# Patient Record
Sex: MALE | Race: OTHER | HISPANIC OR LATINO | URBAN - METROPOLITAN AREA
[De-identification: names, ages, dates, MRNs, and addresses within clinical notes are randomized per-mention and may not be internally consistent; named-entity substitution may affect disease eponyms.]

---

## 2023-07-12 ENCOUNTER — INPATIENT (INPATIENT)
Facility: HOSPITAL | Age: 33
LOS: 1 days | Discharge: HOME | End: 2023-07-14
Attending: INTERNAL MEDICINE | Admitting: INTERNAL MEDICINE
Payer: MEDICAID

## 2023-07-12 VITALS
SYSTOLIC BLOOD PRESSURE: 133 MMHG | DIASTOLIC BLOOD PRESSURE: 112 MMHG | RESPIRATION RATE: 30 BRPM | OXYGEN SATURATION: 97 % | HEART RATE: 110 BPM

## 2023-07-12 LAB
ALBUMIN SERPL ELPH-MCNC: 5.8 G/DL — HIGH (ref 3.3–5)
ALP SERPL-CCNC: 87 U/L — SIGNIFICANT CHANGE UP (ref 40–120)
ALT FLD-CCNC: 27 U/L — SIGNIFICANT CHANGE UP (ref 4–41)
AMPHET UR-MCNC: NEGATIVE — SIGNIFICANT CHANGE UP
ANION GAP SERPL CALC-SCNC: 26 MMOL/L — HIGH (ref 7–14)
APAP SERPL-MCNC: <10 UG/ML — LOW (ref 15–25)
APPEARANCE UR: CLEAR — SIGNIFICANT CHANGE UP
AST SERPL-CCNC: 32 U/L — SIGNIFICANT CHANGE UP (ref 4–40)
BACTERIA # UR AUTO: NEGATIVE /HPF — SIGNIFICANT CHANGE UP
BARBITURATES UR SCN-MCNC: NEGATIVE — SIGNIFICANT CHANGE UP
BASE EXCESS BLDV CALC-SCNC: 1.4 MMOL/L — SIGNIFICANT CHANGE UP (ref -2–3)
BASOPHILS # BLD AUTO: 0.06 K/UL — SIGNIFICANT CHANGE UP (ref 0–0.2)
BASOPHILS NFR BLD AUTO: 0.5 % — SIGNIFICANT CHANGE UP (ref 0–2)
BENZODIAZ UR-MCNC: NEGATIVE — SIGNIFICANT CHANGE UP
BILIRUB SERPL-MCNC: 1.3 MG/DL — HIGH (ref 0.2–1.2)
BILIRUB UR-MCNC: NEGATIVE — SIGNIFICANT CHANGE UP
BLOOD GAS VENOUS COMPREHENSIVE RESULT: SIGNIFICANT CHANGE UP
BUN SERPL-MCNC: 21 MG/DL — SIGNIFICANT CHANGE UP (ref 7–23)
CALCIUM SERPL-MCNC: 11 MG/DL — HIGH (ref 8.4–10.5)
CAST: 4 /LPF — SIGNIFICANT CHANGE UP (ref 0–4)
CHLORIDE BLDV-SCNC: 93 MMOL/L — LOW (ref 96–108)
CHLORIDE SERPL-SCNC: 90 MMOL/L — LOW (ref 98–107)
CK SERPL-CCNC: 582 U/L — HIGH (ref 30–200)
CO2 BLDV-SCNC: 22.7 MMOL/L — SIGNIFICANT CHANGE UP (ref 22–26)
CO2 SERPL-SCNC: 18 MMOL/L — LOW (ref 22–31)
COCAINE METAB.OTHER UR-MCNC: NEGATIVE — SIGNIFICANT CHANGE UP
COLOR SPEC: YELLOW — SIGNIFICANT CHANGE UP
CREAT SERPL-MCNC: 1.52 MG/DL — HIGH (ref 0.5–1.3)
CREATININE URINE RESULT, DAU: 73 MG/DL — SIGNIFICANT CHANGE UP
DIFF PNL FLD: ABNORMAL
EGFR: 62 ML/MIN/1.73M2 — SIGNIFICANT CHANGE UP
EOSINOPHIL # BLD AUTO: 0.01 K/UL — SIGNIFICANT CHANGE UP (ref 0–0.5)
EOSINOPHIL NFR BLD AUTO: 0.1 % — SIGNIFICANT CHANGE UP (ref 0–6)
ETHANOL SERPL-MCNC: <10 MG/DL — SIGNIFICANT CHANGE UP
GAS PNL BLDV: 128 MMOL/L — LOW (ref 136–145)
GAS PNL BLDV: SIGNIFICANT CHANGE UP
GLUCOSE BLDV-MCNC: 98 MG/DL — SIGNIFICANT CHANGE UP (ref 70–99)
GLUCOSE SERPL-MCNC: 97 MG/DL — SIGNIFICANT CHANGE UP (ref 70–99)
GLUCOSE UR QL: NEGATIVE MG/DL — SIGNIFICANT CHANGE UP
HCO3 BLDV-SCNC: 22 MMOL/L — SIGNIFICANT CHANGE UP (ref 22–29)
HCT VFR BLD CALC: 47.2 % — SIGNIFICANT CHANGE UP (ref 39–50)
HCT VFR BLDA CALC: 51 % — SIGNIFICANT CHANGE UP (ref 39–51)
HGB BLD CALC-MCNC: 16.9 G/DL — SIGNIFICANT CHANGE UP (ref 12.6–17.4)
HGB BLD-MCNC: 16.1 G/DL — SIGNIFICANT CHANGE UP (ref 13–17)
IANC: 9.19 K/UL — HIGH (ref 1.8–7.4)
IMM GRANULOCYTES NFR BLD AUTO: 0.5 % — SIGNIFICANT CHANGE UP (ref 0–0.9)
KETONES UR-MCNC: ABNORMAL MG/DL
LACTATE BLDV-MCNC: 2.4 MMOL/L — HIGH (ref 0.5–2)
LACTATE BLDV-MCNC: 6.3 MMOL/L — CRITICAL HIGH (ref 0.5–2)
LEUKOCYTE ESTERASE UR-ACNC: NEGATIVE — SIGNIFICANT CHANGE UP
LYMPHOCYTES # BLD AUTO: 1.23 K/UL — SIGNIFICANT CHANGE UP (ref 1–3.3)
LYMPHOCYTES # BLD AUTO: 10.8 % — LOW (ref 13–44)
MAGNESIUM SERPL-MCNC: 1.6 MG/DL — SIGNIFICANT CHANGE UP (ref 1.6–2.6)
MCHC RBC-ENTMCNC: 28.8 PG — SIGNIFICANT CHANGE UP (ref 27–34)
MCHC RBC-ENTMCNC: 34.1 GM/DL — SIGNIFICANT CHANGE UP (ref 32–36)
MCV RBC AUTO: 84.3 FL — SIGNIFICANT CHANGE UP (ref 80–100)
METHADONE UR-MCNC: NEGATIVE — SIGNIFICANT CHANGE UP
MONOCYTES # BLD AUTO: 0.84 K/UL — SIGNIFICANT CHANGE UP (ref 0–0.9)
MONOCYTES NFR BLD AUTO: 7.4 % — SIGNIFICANT CHANGE UP (ref 2–14)
NEUTROPHILS # BLD AUTO: 9.19 K/UL — HIGH (ref 1.8–7.4)
NEUTROPHILS NFR BLD AUTO: 80.7 % — HIGH (ref 43–77)
NITRITE UR-MCNC: NEGATIVE — SIGNIFICANT CHANGE UP
NRBC # BLD: 0 /100 WBCS — SIGNIFICANT CHANGE UP (ref 0–0)
NRBC # FLD: 0 K/UL — SIGNIFICANT CHANGE UP (ref 0–0)
OPIATES UR-MCNC: NEGATIVE — SIGNIFICANT CHANGE UP
OXYCODONE UR-MCNC: NEGATIVE — SIGNIFICANT CHANGE UP
PCO2 BLDV: 25 MMHG — LOW (ref 42–55)
PCP SPEC-MCNC: SIGNIFICANT CHANGE UP
PCP UR-MCNC: NEGATIVE — SIGNIFICANT CHANGE UP
PH BLDV: 7.55 — HIGH (ref 7.32–7.43)
PH UR: 6 — SIGNIFICANT CHANGE UP (ref 5–8)
PHOSPHATE SERPL-MCNC: 3 MG/DL — SIGNIFICANT CHANGE UP (ref 2.5–4.5)
PLATELET # BLD AUTO: 259 K/UL — SIGNIFICANT CHANGE UP (ref 150–400)
PO2 BLDV: 27 MMHG — SIGNIFICANT CHANGE UP (ref 25–45)
POTASSIUM BLDV-SCNC: 4.4 MMOL/L — SIGNIFICANT CHANGE UP (ref 3.5–5.1)
POTASSIUM SERPL-MCNC: 4.5 MMOL/L — SIGNIFICANT CHANGE UP (ref 3.5–5.3)
POTASSIUM SERPL-SCNC: 4.5 MMOL/L — SIGNIFICANT CHANGE UP (ref 3.5–5.3)
PROT SERPL-MCNC: 8.2 G/DL — SIGNIFICANT CHANGE UP (ref 6–8.3)
PROT UR-MCNC: NEGATIVE MG/DL — SIGNIFICANT CHANGE UP
RBC # BLD: 5.6 M/UL — SIGNIFICANT CHANGE UP (ref 4.2–5.8)
RBC # FLD: 12.4 % — SIGNIFICANT CHANGE UP (ref 10.3–14.5)
RBC CASTS # UR COMP ASSIST: 13 /HPF — HIGH (ref 0–4)
SALICYLATES SERPL-MCNC: <0.3 MG/DL — LOW (ref 15–30)
SAO2 % BLDV: 52 % — LOW (ref 67–88)
SODIUM SERPL-SCNC: 134 MMOL/L — LOW (ref 135–145)
SP GR SPEC: 1.01 — SIGNIFICANT CHANGE UP (ref 1–1.03)
SQUAMOUS # UR AUTO: 0 /HPF — SIGNIFICANT CHANGE UP (ref 0–5)
THC UR QL: POSITIVE
TOXICOLOGY SCREEN, DRUGS OF ABUSE, SERUM RESULT: SIGNIFICANT CHANGE UP
UROBILINOGEN FLD QL: 0.2 MG/DL — SIGNIFICANT CHANGE UP (ref 0.2–1)
WBC # BLD: 11.39 K/UL — HIGH (ref 3.8–10.5)
WBC # FLD AUTO: 11.39 K/UL — HIGH (ref 3.8–10.5)
WBC UR QL: 0 /HPF — SIGNIFICANT CHANGE UP (ref 0–5)

## 2023-07-12 PROCEDURE — 71045 X-RAY EXAM CHEST 1 VIEW: CPT | Mod: 26

## 2023-07-12 PROCEDURE — 99285 EMERGENCY DEPT VISIT HI MDM: CPT

## 2023-07-12 PROCEDURE — 74177 CT ABD & PELVIS W/CONTRAST: CPT | Mod: 26,MA

## 2023-07-12 RX ORDER — SODIUM CHLORIDE 9 MG/ML
2000 INJECTION, SOLUTION INTRAVENOUS ONCE
Refills: 0 | Status: COMPLETED | OUTPATIENT
Start: 2023-07-12 | End: 2023-07-12

## 2023-07-12 RX ORDER — MORPHINE SULFATE 50 MG/1
4 CAPSULE, EXTENDED RELEASE ORAL ONCE
Refills: 0 | Status: DISCONTINUED | OUTPATIENT
Start: 2023-07-12 | End: 2023-07-12

## 2023-07-12 RX ADMIN — MORPHINE SULFATE 4 MILLIGRAM(S): 50 CAPSULE, EXTENDED RELEASE ORAL at 23:02

## 2023-07-12 RX ADMIN — MORPHINE SULFATE 4 MILLIGRAM(S): 50 CAPSULE, EXTENDED RELEASE ORAL at 18:51

## 2023-07-12 RX ADMIN — SODIUM CHLORIDE 2000 MILLILITER(S): 9 INJECTION, SOLUTION INTRAVENOUS at 18:51

## 2023-07-12 NOTE — ED PROVIDER NOTE - OBJECTIVE STATEMENT
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  Patient brought in by EMS found with Peaked Ts on EKG. Patient reports last urine 10am. Denies cp, sob, diarrhea. Denies drug use

## 2023-07-12 NOTE — ED PROVIDER NOTE - LANGUAGE ASSISTANCE NEEDED
No-Patient/Caregiver offered and refused free interpretation services. RN bedside providing Translation/No-Patient/Caregiver offered and refused free interpretation services.

## 2023-07-12 NOTE — ED PROVIDER NOTE - PROGRESS NOTE DETAILS
Ivanna Rushing MD PGY-2: patient taken in sign out at time of admission pending CT abd, admission for likely rhabdomyolysis. abd CT no acute findings. discussed with hospitalist who accepted patient for admission for ongoing fluid resuscitation and monitoring.

## 2023-07-12 NOTE — ED PROVIDER NOTE - CARE PLAN
1 Principal Discharge DX:	Rhabdomyolysis   Principal Discharge DX:	Rhabdomyolysis  Secondary Diagnosis:	SUDHIR (acute kidney injury)

## 2023-07-12 NOTE — ED PROVIDER NOTE - ATTENDING CONTRIBUTION TO CARE
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  Severely uncomfortable.  VSS.  RLQ ttp.  EKG peaked TW throughout.  No PMXH, doesn't take meds.  Pt appears to have rhabdomyolysis, CK not particularly high but (+)blood in urine and mild SUDHIR.  May be early in rhabdo process.  Rx copious fluids, admit as rhabdo takes days to resolve, and needs frequent blood testing to guide therapy.   Peaked TW likely r/t FELICIA.   VS:  tachycardia tachypnea    GEN - malaise, mild distress body pain   A+O x3   HEAD - NC/AT     ENT - PEERL, EOMI, mucous membranes  dry, no discharge      NECK: Neck supple, non-tender without lymphadenopathy, no masses, no JVD  PULM - CTA b/l,  symmetric breath sounds  COR -  fast heart sounds    ABD - , ND, NT, soft,  BACK - no CVA tenderness, nontender spine     EXTREMS - no edema, no deformity, warm and well perfused   No focal muscle tenderness or rash or deformity.    SKIN - no rash    or bruising      NEUROLOGIC - alert, face symmetric, speech fluent, sensation nl, motor no focal deficit.

## 2023-07-12 NOTE — ED ADULT TRIAGE NOTE - CHIEF COMPLAINT QUOTE
Pt reporting to the ED for "Body pain". Pt  outside working all day, went to home complaining of  muscle cramping and pain, 911 called. EMS reports pt responsive to verbal stimuli, EKG by EMS peaked T waves, notification called in for possible rhabdomyolysis. Pt tachycardic in triage and hypertensive. Pt brought to room 27 charge notified.

## 2023-07-12 NOTE — ED ADULT NURSE NOTE - OBJECTIVE STATEMENT
Arvin RN: pt A&Ox4, no past medical history, presents to ED from home via EMS for body pain. Patient states he was working all day in the sun (works as a ). Reported cramping to upper and lower extremities that started around 3 pm but worsened which prompted the call to EMS. Patient arrives tachycardiac, with rigors (rectal temp 99.4), noticeable tetany to bilateral hands, 16 G to R AC placed via EMS with 1 liter of normal saline that was infused. Respirations are even and unlabored, sating at 100% on room air, 18 G to L AC placed in ED, labs sent, and medicated as ordered. Report given to primary RN.

## 2023-07-13 DIAGNOSIS — Z29.9 ENCOUNTER FOR PROPHYLACTIC MEASURES, UNSPECIFIED: ICD-10-CM

## 2023-07-13 DIAGNOSIS — M62.82 RHABDOMYOLYSIS: ICD-10-CM

## 2023-07-13 DIAGNOSIS — R10.9 UNSPECIFIED ABDOMINAL PAIN: ICD-10-CM

## 2023-07-13 DIAGNOSIS — N17.9 ACUTE KIDNEY FAILURE, UNSPECIFIED: ICD-10-CM

## 2023-07-13 DIAGNOSIS — J96.01 ACUTE RESPIRATORY FAILURE WITH HYPOXIA: ICD-10-CM

## 2023-07-13 LAB
ANION GAP SERPL CALC-SCNC: 11 MMOL/L — SIGNIFICANT CHANGE UP (ref 7–14)
ANION GAP SERPL CALC-SCNC: 12 MMOL/L — SIGNIFICANT CHANGE UP (ref 7–14)
BASE EXCESS BLDV CALC-SCNC: 1.5 MMOL/L — SIGNIFICANT CHANGE UP (ref -2–3)
BASOPHILS # BLD AUTO: 0.04 K/UL — SIGNIFICANT CHANGE UP (ref 0–0.2)
BASOPHILS NFR BLD AUTO: 0.7 % — SIGNIFICANT CHANGE UP (ref 0–2)
BUN SERPL-MCNC: 14 MG/DL — SIGNIFICANT CHANGE UP (ref 7–23)
BUN SERPL-MCNC: 14 MG/DL — SIGNIFICANT CHANGE UP (ref 7–23)
CA-I SERPL-SCNC: 1.2 MMOL/L — SIGNIFICANT CHANGE UP (ref 1.15–1.33)
CALCIUM SERPL-MCNC: 8.9 MG/DL — SIGNIFICANT CHANGE UP (ref 8.4–10.5)
CALCIUM SERPL-MCNC: 9.3 MG/DL — SIGNIFICANT CHANGE UP (ref 8.4–10.5)
CHLORIDE BLDV-SCNC: 102 MMOL/L — SIGNIFICANT CHANGE UP (ref 96–108)
CHLORIDE SERPL-SCNC: 101 MMOL/L — SIGNIFICANT CHANGE UP (ref 98–107)
CHLORIDE SERPL-SCNC: 103 MMOL/L — SIGNIFICANT CHANGE UP (ref 98–107)
CK SERPL-CCNC: 1210 U/L — HIGH (ref 30–200)
CK SERPL-CCNC: 968 U/L — HIGH (ref 30–200)
CO2 BLDV-SCNC: 28.6 MMOL/L — HIGH (ref 22–26)
CO2 SERPL-SCNC: 24 MMOL/L — SIGNIFICANT CHANGE UP (ref 22–31)
CO2 SERPL-SCNC: 24 MMOL/L — SIGNIFICANT CHANGE UP (ref 22–31)
CREAT SERPL-MCNC: 0.87 MG/DL — SIGNIFICANT CHANGE UP (ref 0.5–1.3)
CREAT SERPL-MCNC: 0.95 MG/DL — SIGNIFICANT CHANGE UP (ref 0.5–1.3)
CULTURE RESULTS: SIGNIFICANT CHANGE UP
EGFR: 110 ML/MIN/1.73M2 — SIGNIFICANT CHANGE UP
EGFR: 118 ML/MIN/1.73M2 — SIGNIFICANT CHANGE UP
EOSINOPHIL # BLD AUTO: 0.1 K/UL — SIGNIFICANT CHANGE UP (ref 0–0.5)
EOSINOPHIL NFR BLD AUTO: 1.8 % — SIGNIFICANT CHANGE UP (ref 0–6)
GAS PNL BLDV: 136 MMOL/L — SIGNIFICANT CHANGE UP (ref 136–145)
GAS PNL BLDV: SIGNIFICANT CHANGE UP
GLUCOSE BLDV-MCNC: 101 MG/DL — HIGH (ref 70–99)
GLUCOSE SERPL-MCNC: 104 MG/DL — HIGH (ref 70–99)
GLUCOSE SERPL-MCNC: 106 MG/DL — HIGH (ref 70–99)
HCO3 BLDV-SCNC: 27 MMOL/L — SIGNIFICANT CHANGE UP (ref 22–29)
HCT VFR BLD CALC: 41.5 % — SIGNIFICANT CHANGE UP (ref 39–50)
HCT VFR BLDA CALC: 42 % — SIGNIFICANT CHANGE UP (ref 39–51)
HGB BLD CALC-MCNC: 14 G/DL — SIGNIFICANT CHANGE UP (ref 12.6–17.4)
HGB BLD-MCNC: 13.7 G/DL — SIGNIFICANT CHANGE UP (ref 13–17)
IANC: 3.36 K/UL — SIGNIFICANT CHANGE UP (ref 1.8–7.4)
IMM GRANULOCYTES NFR BLD AUTO: 0.4 % — SIGNIFICANT CHANGE UP (ref 0–0.9)
LACTATE BLDV-MCNC: 1.3 MMOL/L — SIGNIFICANT CHANGE UP (ref 0.5–2)
LYMPHOCYTES # BLD AUTO: 1.61 K/UL — SIGNIFICANT CHANGE UP (ref 1–3.3)
LYMPHOCYTES # BLD AUTO: 28.6 % — SIGNIFICANT CHANGE UP (ref 13–44)
MAGNESIUM SERPL-MCNC: 2 MG/DL — SIGNIFICANT CHANGE UP (ref 1.6–2.6)
MCHC RBC-ENTMCNC: 28.8 PG — SIGNIFICANT CHANGE UP (ref 27–34)
MCHC RBC-ENTMCNC: 33 GM/DL — SIGNIFICANT CHANGE UP (ref 32–36)
MCV RBC AUTO: 87.2 FL — SIGNIFICANT CHANGE UP (ref 80–100)
MONOCYTES # BLD AUTO: 0.49 K/UL — SIGNIFICANT CHANGE UP (ref 0–0.9)
MONOCYTES NFR BLD AUTO: 8.7 % — SIGNIFICANT CHANGE UP (ref 2–14)
NEUTROPHILS # BLD AUTO: 3.36 K/UL — SIGNIFICANT CHANGE UP (ref 1.8–7.4)
NEUTROPHILS NFR BLD AUTO: 59.8 % — SIGNIFICANT CHANGE UP (ref 43–77)
NRBC # BLD: 0 /100 WBCS — SIGNIFICANT CHANGE UP (ref 0–0)
NRBC # FLD: 0 K/UL — SIGNIFICANT CHANGE UP (ref 0–0)
PCO2 BLDV: 46 MMHG — SIGNIFICANT CHANGE UP (ref 42–55)
PH BLDV: 7.38 — SIGNIFICANT CHANGE UP (ref 7.32–7.43)
PHOSPHATE SERPL-MCNC: 3.3 MG/DL — SIGNIFICANT CHANGE UP (ref 2.5–4.5)
PLATELET # BLD AUTO: 191 K/UL — SIGNIFICANT CHANGE UP (ref 150–400)
PO2 BLDV: 56 MMHG — HIGH (ref 25–45)
POTASSIUM BLDV-SCNC: 3.6 MMOL/L — SIGNIFICANT CHANGE UP (ref 3.5–5.1)
POTASSIUM SERPL-MCNC: 3.7 MMOL/L — SIGNIFICANT CHANGE UP (ref 3.5–5.3)
POTASSIUM SERPL-MCNC: 3.8 MMOL/L — SIGNIFICANT CHANGE UP (ref 3.5–5.3)
POTASSIUM SERPL-SCNC: 3.7 MMOL/L — SIGNIFICANT CHANGE UP (ref 3.5–5.3)
POTASSIUM SERPL-SCNC: 3.8 MMOL/L — SIGNIFICANT CHANGE UP (ref 3.5–5.3)
RBC # BLD: 4.76 M/UL — SIGNIFICANT CHANGE UP (ref 4.2–5.8)
RBC # FLD: 12.6 % — SIGNIFICANT CHANGE UP (ref 10.3–14.5)
SAO2 % BLDV: 86.7 % — SIGNIFICANT CHANGE UP (ref 67–88)
SODIUM SERPL-SCNC: 137 MMOL/L — SIGNIFICANT CHANGE UP (ref 135–145)
SODIUM SERPL-SCNC: 138 MMOL/L — SIGNIFICANT CHANGE UP (ref 135–145)
SPECIMEN SOURCE: SIGNIFICANT CHANGE UP
WBC # BLD: 5.62 K/UL — SIGNIFICANT CHANGE UP (ref 3.8–10.5)
WBC # FLD AUTO: 5.62 K/UL — SIGNIFICANT CHANGE UP (ref 3.8–10.5)

## 2023-07-13 PROCEDURE — 99223 1ST HOSP IP/OBS HIGH 75: CPT

## 2023-07-13 RX ORDER — ACETAMINOPHEN 500 MG
2 TABLET ORAL
Qty: 0 | Refills: 0 | DISCHARGE
Start: 2023-07-13

## 2023-07-13 RX ORDER — ACETAMINOPHEN 500 MG
650 TABLET ORAL EVERY 6 HOURS
Refills: 0 | Status: DISCONTINUED | OUTPATIENT
Start: 2023-07-13 | End: 2023-07-14

## 2023-07-13 RX ORDER — SODIUM CHLORIDE 9 MG/ML
1000 INJECTION INTRAMUSCULAR; INTRAVENOUS; SUBCUTANEOUS
Refills: 0 | Status: DISCONTINUED | OUTPATIENT
Start: 2023-07-13 | End: 2023-07-13

## 2023-07-13 RX ORDER — SODIUM CHLORIDE 9 MG/ML
1000 INJECTION INTRAMUSCULAR; INTRAVENOUS; SUBCUTANEOUS
Refills: 0 | Status: DISCONTINUED | OUTPATIENT
Start: 2023-07-13 | End: 2023-07-14

## 2023-07-13 RX ORDER — LANOLIN ALCOHOL/MO/W.PET/CERES
3 CREAM (GRAM) TOPICAL AT BEDTIME
Refills: 0 | Status: DISCONTINUED | OUTPATIENT
Start: 2023-07-13 | End: 2023-07-14

## 2023-07-13 RX ORDER — ONDANSETRON 8 MG/1
4 TABLET, FILM COATED ORAL EVERY 8 HOURS
Refills: 0 | Status: DISCONTINUED | OUTPATIENT
Start: 2023-07-13 | End: 2023-07-14

## 2023-07-13 RX ADMIN — Medication 650 MILLIGRAM(S): at 18:58

## 2023-07-13 RX ADMIN — SODIUM CHLORIDE 150 MILLILITER(S): 9 INJECTION INTRAMUSCULAR; INTRAVENOUS; SUBCUTANEOUS at 12:07

## 2023-07-13 RX ADMIN — Medication 650 MILLIGRAM(S): at 19:55

## 2023-07-13 RX ADMIN — SODIUM CHLORIDE 100 MILLILITER(S): 9 INJECTION INTRAMUSCULAR; INTRAVENOUS; SUBCUTANEOUS at 01:34

## 2023-07-13 NOTE — ED ADULT NURSE REASSESSMENT NOTE - NS ED NURSE REASSESS COMMENT FT1
Break Coverage RN: Pt A&Ox4, respirations equal and unlabored. Pt resting in stretcher at this time, offers no complaints. Pt transported to inpatient bed assignment. No acute distress noted upon leaving the unit.

## 2023-07-13 NOTE — H&P ADULT - PROBLEM SELECTOR PLAN 2
Pt with SUDHIR on admission to hospital with sCr of 1.52. Likely in setting of dehydration, less likely heme induced SUDHIR 2/2 rhabdo. Resolving.    - Trend Cr (1.52 -> .95)  - avoid nephrotoxic medications.

## 2023-07-13 NOTE — DISCHARGE NOTE PROVIDER - HOSPITAL COURSE
CHRISTIN MONROY is a 34yo M with no PMH who presented with acute onset of muscle pain and cramping after a day of working outside in the sun. Pt was in his usual state of health until about 2pm the day of presentation to the ED. 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 The patient describes that he normally skips breakfast but had to work through his lunch break. He did not have a break until 2pm that day, at which point he went to wash his face since it was dirty. He describes that immediately after washing his face, he began to feel a sudden onset of cramping and pain in his legs. This pain continued until he had to leave work early around 3pm. The patient went home and noticed that the pain became diffuse throughout his body and he felt the pain over his face, hands, fingers, legs, back, and abdomen. He endorses that he also had "hernia like" pain and knots all over his body. The "hernias" were similar to when a muscle would cramp up and be contracted. He also describes feeling very hot at this time. He describes that he was in so much pain that he was shaking. Pt denies taking anything at home for the pain. Pt then came to the ED for evaluation given nonresolving pain.     Hospital Course: Patient seen and evaluated in the ED. Found to be tachycardic to 110, hypertensive to 144/130, T99.4F, RR 30. EKG found to have peaked T waves. Pt   started on NC@3L and 2L bolus with LR. UA showed blood with no RBC. CXR, CT A/P negative. CK found to be elevated @ 582, lactate 6.3. Pt admitted to medicine for continued management of rhabdomyolysis. On 7/13, patient reported resolution of symptoms following IVF rehydration with good urine output. Patient creatine kinase trended and STABILIZED/DOWNTRENDING. On 7/14, patient examined and deemed medically stable for discharge.    Pt seen at bedside this AM, reports that he is feeling good with no acute concerns. Pt reported resolved pain after fluid bolus with resolution of VS abnormalities. He reports urinating 7 times since entering the hospital, with no gross hematuria or dark urine. Pt denies fever, headaches, dizziness. Pt denies muscle pain, cramping, or swelling. Pt described feeling weak due to not eating in  2 days. CHRISTIN MONROY is a 32yo M with no PMH who presented with acute onset of muscle pain and cramping after a day of working outside in the sun. Pt was in his usual state of health until about 2pm the day of presentation to the ED. 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 The patient describes that he normally skips breakfast but had to work through his lunch break. He did not have a break until 2pm that day, at which point he went to wash his face since it was dirty. He describes that immediately after washing his face, he began to feel a sudden onset of cramping and pain in his legs. This pain continued until he had to leave work early around 3pm. The patient went home and noticed that the pain became diffuse throughout his body and he felt the pain over his face, hands, fingers, legs, back, and abdomen. He endorses that he also had "hernia like" pain and knots all over his body. The "hernias" were similar to when a muscle would cramp up and be contracted. He also describes feeling very hot at this time. He describes that he was in so much pain that he was shaking. Pt denies taking anything at home for the pain. Pt then came to the ED for evaluation given nonresolving pain.     Hospital Course: Patient seen and evaluated in the ED. Found to be tachycardic to 110, hypertensive to 144/130, T99.4F, RR 30. EKG found to have peaked T waves. Pt   started on NC@3L and 2L bolus with LR. UA showed blood with no RBC. CXR, CT A/P negative. CK found to be elevated @ 582, lactate 6.3. Pt admitted to medicine for continued management of rhabdomyolysis. On 7/13, patient reported resolution of symptoms following IVF rehydration with good urine output. Patient creatine kinase trended and downtrending on 7/14. On 7/14, patient examined and is hemodynamically stable and deemed medically stable for discharge.

## 2023-07-13 NOTE — H&P ADULT - ATTENDING COMMENTS
33M, recently immigrated from [facility], no PMH p/w acute onset of muscle pain and cramping during work as a , found to have SUDHIR and mildly elevated CK, a/f treatment of rhabdomyolysis.  SUDHIR resolved, will c/w fluids as CK mildly elevated, will trend and if coming down can DC in AM.

## 2023-07-13 NOTE — H&P ADULT - NSICDXFAMILYHX_GEN_ALL_CORE_FT
FAMILY HISTORY:  Aunt  Still living? Yes, Estimated age: Age Unknown  FH: diabetes mellitus, Age at diagnosis: Age Unknown

## 2023-07-13 NOTE — H&P ADULT - PROBLEM SELECTOR PLAN 3
Pt with reported "hernia like pain", diffuse pain across abdomen. Suspect metabolic i/s/o rhabdo vs less likely infectious given no N/V or diarrhea. CT A/P with no acute findings decreases suspicion for further structural causes. resolved.    - 7/12 CT A/P negative.

## 2023-07-13 NOTE — H&P ADULT - HISTORY OF PRESENT ILLNESS
Pt interviewed with  #s [id number], [id number]    CHRISTIN MONROY is a 34yo M with no PMH who presented with acute onset of muscle pain and cramping after a day of working outside in the sun. Pt was in his usual state of health until about 2pm the day of presentation to the ED. 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 The patient describes that he normally skips breakfast but had to work through his lunch break. He did not have a break until 2pm that day, at which point he went to wash his face since it was dirty. He describes that immediately after washing his face, he began to feel a sudden onset of cramping and pain in his legs. This pain continued until he had to leave work early around 3pm. The patient went home and noticed that the pain became diffuse throughout his body and he felt the pain over his face, hands, fingers, legs, back, and abdomen. He endorses that he also had "hernia like" pain and knots all over his body. The "hernias" were similar to when a muscle would cramp up and be contracted. He also describes feeling very hot at this time. He describes that he was in so much pain that he was shaking. Pt denies taking anything at home for the pain. Pt then came to the ED for evaluation given nonresolving pain.     Pt seen at bedside this AM, reports that he is feeling good with no acute concerns. Pt reported resolved pain after fluid bolus with resolution of VS abnormalities. He reports urinating 7 times since entering the hospital, with no gross hematuria or dark urine. Pt denies fever, headaches, dizziness. Pt denies muscle pain, cramping, or swelling. Pt described feeling weak due to not eating in  2 days.    ED Course: Patient seen and evaluated in the ED. Found to be tachycardic to 110, hypertensive to 144/130, T99.4F, RR 30. EKG found to have peaked T waves. Pt started on NC@3L and 2L bolus with LR. UA showed blood with no RBC c/w rhabdo. CXR, CT A/P negative. CK found to be elevated @ 582, lactate 6.3. Pt interviewed with  #s [id number], [id number]    CHRISTIN MONROY is a 32yo M with no PMH who presented with acute onset of muscle pain and cramping after a day of working outside in the sun. Pt was in his usual state of health until about 2pm the day of presentation to the ED. 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 The patient describes that he normally skips breakfast but had to work through his lunch break. He did not have a break until 2pm that day, at which point he went to wash his face since it was dirty. He describes that immediately after washing his face, he began to feel a sudden onset of cramping and pain in his legs. This pain continued until he had to leave work early around 3pm. The patient went home and noticed that the pain became diffuse throughout his body and he felt the pain over his face, hands, fingers, legs, back, and abdomen. He endorses that he also had "hernia like" pain and knots all over his body. The "hernias" were similar to when a muscle would cramp up and be contracted. He also describes feeling very hot at this time. He describes that he was in so much pain that he was shaking. Pt denies taking anything at home for the pain. Pt then came to the ED for evaluation given nonresolving pain.     ED Course: Patient seen and evaluated in the ED. Found to be tachycardic to 110, hypertensive to 144/130, T99.4F, RR 30. EKG found to have peaked T waves. Pt   started on NC@3L and 2L bolus with LR. UA showed blood with no RBC. CXR, CT A/P negative. CK found to be elevated @ 582, lactate 6.3.    Pt seen at bedside this AM, reports that he is feeling good with no acute concerns. Pt reported resolved pain after fluid bolus with resolution of VS abnormalities. He reports urinating 7 times since entering the hospital, with no gross hematuria or dark urine. Pt denies fever, headaches, dizziness. Pt denies muscle pain, cramping, or swelling. Pt described feeling weak due to not eating in  2 days.

## 2023-07-13 NOTE — H&P ADULT - PROBLEM SELECTOR PLAN 1
Patient with muscle pain and cramping that began in lower extremity and spread to diffuse pattern in setting of physically intense work day. UA w/ large blood, 14 rbc, elevated CK. Likely rhabdomyolysis vs less likely heat exhaustion given afebrile on admission.    - s/p 2L bolus LR  - hydration with NS@100cc/hr  - trend CK (582 -> 968) Patient with muscle pain and cramping that began in lower extremity and spread to diffuse pattern in setting of physically intense work day. UA w/ large blood, 14 rbc, elevated CK. Likely rhabdomyolysis vs less likely heat exhaustion given afebrile on admission.    - s/p 2L bolus LR  - hydration with NS@150cc/hr  - trend CK (582 -> 968)

## 2023-07-13 NOTE — ED ADULT NURSE REASSESSMENT NOTE - CONDITION
Spoke to MAR. Instructed not to place indwelling francisco catheter until attending evaluates lalito. Kirsten torres , R.N. aware.

## 2023-07-13 NOTE — PATIENT PROFILE ADULT - FALL HARM RISK - UNIVERSAL INTERVENTIONS
Bed in lowest position, wheels locked, appropriate side rails in place/Call bell, personal items and telephone in reach/Instruct patient to call for assistance before getting out of bed or chair/Non-slip footwear when patient is out of bed/[place] to call system/Physically safe environment - no spills, clutter or unnecessary equipment/Purposeful Proactive Rounding/Room/bathroom lighting operational, light cord in reach

## 2023-07-13 NOTE — H&P ADULT - NSHPLABSRESULTS_GEN_ALL_CORE
16.1   11.39 )-----------( 259      ( 12 Jul 2023 18:34 )             47.2       07-13    137  |  101  |  14  ----------------------------<  106<H>  3.7   |  24  |  0.95    Ca    9.3      13 Jul 2023 00:58  Phos  3.0     07-12  Mg     1.60     07-12    TPro  8.2  /  Alb  5.8<H>  /  TBili  1.3<H>  /  DBili  x   /  AST  32  /  ALT  27  /  AlkPhos  87  07-12      Creatine Kinase, Serum: 968 U/L (07.13.23 @ 00:58)   Creatine Kinase, Serum: 582 U/L (07.12.23 @ 18:34)     Urinalysis Basic - ( 13 Jul 2023 00:58 )    Color: x / Appearance: x / SG: x / pH: x  Gluc: 106 mg/dL / Ketone: x  / Bili: x / Urobili: x   Blood: x / Protein: x / Nitrite: x   Leuk Esterase: x / RBC: x / WBC x   Sq Epi: x / Non Sq Epi: x / Bacteria: x

## 2023-07-13 NOTE — DISCHARGE NOTE PROVIDER - NSDCCPCAREPLAN_GEN_ALL_CORE_FT
PRINCIPAL DISCHARGE DIAGNOSIS  Diagnosis: Rhabdomyolysis  Assessment and Plan of Treatment: You were admitted to the hospital for management of rhabdomyolysis. This means that your muscles were injured and breaking down, which caused your pain and cramping. You received intravenous fluid to help rehydrate you. After receiving the fluids, you had improvement in your symptoms. We monitored your creatine kinase, a marker for your muscle breakdown, and found that your numbers were decreasing.   Please call and make an appointment to see a doctor in 1-2 weeks. If you notice that you are having severe muscle pain and cramping, blood in urine, or dark-brown urine, please go to the ER for evaluation. reach out to the emergency room for evaluation.      SECONDARY DISCHARGE DIAGNOSES  Diagnosis: SUDHIR (acute kidney injury)  Assessment and Plan of Treatment: Your creatinine, which is a marker for your kidney function, was high when you were admitted to the hospital. After you received fluids, your creatinine normalized. Your increase was likely due to dehydration. Please ensure you hydrate well while working and take breaks if possible from the sun.     PRINCIPAL DISCHARGE DIAGNOSIS  Diagnosis: Rhabdomyolysis  Assessment and Plan of Treatment: You were admitted to the hospital for management of rhabdomyolysis. This means that your muscles were injured and breaking down, which caused your pain and cramping. You received intravenous fluid to help rehydrate you. After receiving the fluids, you had improvement in your symptoms. We monitored your creatine kinase, a marker for your muscle breakdown, and found that your numbers were decreasing.   Please call and make an appointment to see a doctor in 1-2 weeks. If you notice that you are having severe muscle pain and cramping, blood in urine, or dark-brown urine, please go to the ER for evaluation. reach out to the emergency room for evaluation.  Ingresó en el hospital para el tratamiento de la rabdomiolisis. McCoy significa que royer músculos se lesionaron y se rompieron, lo que causó lund dolor y calambres. Recibió líquido intravenoso para ayudar a rehidratarlo. Después de recibir los líquidos, royer síntomas mejoraron. Supervisamos lund creatina quinasa, un marcador de lund descomposición muscular, y descubrimos que royer números estaban disminuyendo.  Por favor llame y quinten amarjit sajan para donya a un médico en 1-2 semanas. Si nota que tiene dolor muscular intenso y calambres, jasmin en la orina u orina de color marrón oscuro, vaya a la bethany de emergencias para amarjit evaluación. acérquese a la bethany de emergencias para amarjit evaluación.      SECONDARY DISCHARGE DIAGNOSES  Diagnosis: SUDHIR (acute kidney injury)  Assessment and Plan of Treatment: Your creatinine, which is a marker for your kidney function, was high when you were admitted to the hospital. After you received fluids, your creatinine normalized. Your increase was likely due to dehydration. Please ensure you hydrate well while working and take breaks if possible from the sun.  Lund creatinina, que es un marcador de la función renal, estaba fredi cuando ingresó en el hospital. Después de recibir líquidos, lund creatinina se normalizó. Lund aumento probablemente se debió a la deshidratación. Asegúrese de hidratarse jese mientras trabaja y, si es posible, tome descansos del sol.

## 2023-07-13 NOTE — H&P ADULT - NSHPSOCIALHISTORY_GEN_ALL_CORE
Lives at home with family (wife, 4 kids). 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

## 2023-07-13 NOTE — DISCHARGE NOTE PROVIDER - NSFOLLOWUPCLINICSTOKEN_GEN_ALL_ED_FT
064435:2 weeks|| ||00\01||False;202765: || ||00\01||False; 919845:2 weeks|| ||00\01||False;383153:2 weeks|| ||00\01||False; 403630:2 weeks|| ||00\01||False;841080:2 weeks|| ||00\01||False; 710781:2 weeks|| ||00\01||False;582222:2 weeks|| ||00\01||False;

## 2023-07-13 NOTE — DISCHARGE NOTE PROVIDER - NSDCCPTREATMENT_GEN_ALL_CORE_FT
PRINCIPAL PROCEDURE  Procedure: CT appendix w contrast  Findings and Treatment: 7/12  FINDINGS:  LOWER CHEST: Within normal limits.  LIVER: Within normal limits.  BILE DUCTS: Normal caliber.  GALLBLADDER: Within normal limits.  SPLEEN: Within normal limits.  PANCREAS: Within normal limits.  ADRENALS: Within normal limits.  KIDNEYS/URETERS: Within normal limits.  BLADDER: Within normal limits.  REPRODUCTIVE ORGANS: Prostate within normal limits.  BOWEL: No bowel obstruction. Appendix is normal.  PERITONEUM: No ascites.  VESSELS: Within normal limits.  RETROPERITONEUM/LYMPH NODES: No lymphadenopathy.  ABDOMINAL WALL: Within normal limits.  BONES: Within normal limits.  IMPRESSION:  Normal appendix.        SECONDARY PROCEDURE  Procedure: Urinalysis  Findings and Treatment: 7/12  Urinalysis (07.12.23 @ 19:05)   Glucose Qualitative, Urine: Negative mg/dL  Blood, Urine: Large  pH Urine: 6.0  Color: Yellow  Urine Appearance: Clear  Bilirubin: Negative  Ketone - Urine: Trace mg/dL  Specific Gravity: 1.008  Protein, Urine: Negative mg/dL  Urobilinogen: 0.2 mg/dL  Nitrite: Negative  Leukocyte Esterase Concentration: Negative      Procedure: Creatine kinase  Findings and Treatment:      PRINCIPAL PROCEDURE  Procedure: CT appendix w contrast  Findings and Treatment: 7/12  FINDINGS:  LOWER CHEST: Within normal limits.  LIVER: Within normal limits.  BILE DUCTS: Normal caliber.  GALLBLADDER: Within normal limits.  SPLEEN: Within normal limits.  PANCREAS: Within normal limits.  ADRENALS: Within normal limits.  KIDNEYS/URETERS: Within normal limits.  BLADDER: Within normal limits.  REPRODUCTIVE ORGANS: Prostate within normal limits.  BOWEL: No bowel obstruction. Appendix is normal.  PERITONEUM: No ascites.  VESSELS: Within normal limits.  RETROPERITONEUM/LYMPH NODES: No lymphadenopathy.  ABDOMINAL WALL: Within normal limits.  BONES: Within normal limits.  IMPRESSION:  Normal appendix.        SECONDARY PROCEDURE  Procedure: Urinalysis  Findings and Treatment: 7/12  Urinalysis (07.12.23 @ 19:05)   Glucose Qualitative, Urine: Negative mg/dL  Blood, Urine: Large  pH Urine: 6.0  Color: Yellow  Urine Appearance: Clear  Bilirubin: Negative  Ketone - Urine: Trace mg/dL  Specific Gravity: 1.008  Protein, Urine: Negative mg/dL  Urobilinogen: 0.2 mg/dL  Nitrite: Negative  Leukocyte Esterase Concentration: Negative      Procedure: Creatine kinase  Findings and Treatment: Creatine Kinase, Serum: 804 U/L (07.14.23 @ 05:47)   Creatine Kinase, Serum: 1210 U/L (07.13.23 @ 14:50)   Creatine Kinase, Serum: 968 U/L (07.13.23 @ 00:58)   Creatine Kinase, Serum: 582 U/L (07.12.23 @ 18:34)

## 2023-07-13 NOTE — DISCHARGE NOTE PROVIDER - NSFOLLOWUPCLINICS_GEN_ALL_ED_FT
[facility]  Internal Medicine  [address] [place] [address]  Phone: [phone]  Fax: [phone]  Follow Up Time: 2 weeks    [facility]  General Pediatrics  [address] [place] [address].  [place] [address]  Phone: [phone]  Fax: [phone]     [facility]  General Pediatrics  [address] [place] [address]  Phone: [phone]  Fax: [phone]  Follow Up Time: 2 weeks    [facility] Internal [facility]  General Internal Medicine  [address] [place] [address]  Phone: [phone]  Fax:   Follow Up Time: 2 weeks     [facility]  General Pediatrics  [address] [place] [address]  Phone: [phone]  Fax: [phone]  Follow Up Time: 2 weeks    [facility] [place]  Internal Medicine  [address] [place] [address]  Phone: [phone]  Fax: [phone]  Follow Up Time: 2 weeks     [facility] [place]  Internal Medicine  [address] [place] [address]  Phone: [phone]  Fax: [phone]  Follow Up Time: 2 weeks    [facility] Pediatric[facility] at [facility]  [address] [place] [address], [facility] [address]  [place] [address]  Phone: [phone]  Fax:   Follow Up Time: 2 weeks

## 2023-07-13 NOTE — H&P ADULT - PROBLEM SELECTOR PLAN 4
DVT: Ambulate as tolerated  Diet: regular diet  Communication: Pending discussion with wife this PM 7/13

## 2023-07-13 NOTE — H&P ADULT - ASSESSMENT
CHRISTIN MONROY is a 32yo M with no PMH who presented with acute onset of muscle pain and cramping after a day of working outside in the sun found to have UA, CK, and clinical history consistent with rhabdomyolysis. Pt admitted for management of rhabdomyolysis and currently improving clinically.

## 2023-07-13 NOTE — H&P ADULT - NSHPPHYSICALEXAM_GEN_ALL_CORE
T(C): 36.6 (07-13-23 @ 02:30), Max: 37.4 (07-12-23 @ 18:35)  HR: 62 (07-13-23 @ 02:30) (62 - 110)  BP: 105/72 (07-13-23 @ 02:30) (105/72 - 144/130)  RR: 18 (07-13-23 @ 02:30) (18 - 30)  SpO2: 100% (07-13-23 @ 02:30) (97% - 100%)    CONSTITUTIONAL: Well groomed, no apparent distress  EYES: PERRLA and symmetric, EOMI, No conjunctival or scleral injection, non-icteric  ENMT: Oral mucosa with moist membranes. Normal dentition; no pharyngeal injection or exudates  NECK: Supple, symmetric and without tracheal deviation   RESP: No respiratory distress, no use of accessory muscles; CTA b/l, no WRR  CV: RRR, +S1S2, no MRG; no JVD; no peripheral edema  GI: Soft, NT, ND, no rebound, no guarding; no palpable masses; no hepatosplenomegaly; no hernia palpated  LYMPH: No cervical LAD or tenderness; no axillary LAD or tenderness; no inguinal LAD or tenderness  MSK: Normal gait; No digital clubbing or cyanosis; examination of the (head/neck/spine/ribs/pelvis, RUE, LUE, RLE, LLE) without misalignment,            Normal ROM without pain, no spinal tenderness, normal muscle strength/tone  SKIN: No rashes or ulcers noted; no subcutaneous nodules or induration palpable  NEURO: CN II-XII intact; normal reflexes in upper and lower extremities, sensation intact in upper and lower extremities b/l to light touch   PSYCH: Appropriate insight/judgment; A+O x 3, mood and affect appropriate, recent/remote memory intact T(C): 36.6 (07-13-23 @ 02:30), Max: 37.4 (07-12-23 @ 18:35)  HR: 62 (07-13-23 @ 02:30) (62 - 110)  BP: 105/72 (07-13-23 @ 02:30) (105/72 - 144/130)  RR: 18 (07-13-23 @ 02:30) (18 - 30)  SpO2: 100% (07-13-23 @ 02:30) (97% - 100%)    CONSTITUTIONAL: Well groomed, no apparent distress  EYES: PERRLA and symmetric, EOMI, No conjunctival or scleral injection, non-icteric  ENMT: Oral mucosa with moist membranes. Normal dentition; no pharyngeal injection or exudates  NECK: Supple, symmetric and without tracheal deviation   RESP: No respiratory distress, no use of accessory muscles; CTA b/l, no WRR  CV: RRR, +S1S2, no MRG; no JVD; no peripheral edema  GI: Soft, NT, ND, no rebound, no guarding; no palpable masses; no hepatosplenomegaly; no hernia palpated  LYMPH: No cervical LAD or tenderness; no axillary LAD or tenderness; no inguinal LAD or tenderness  MSK: No digital clubbing or cyanosis; Normal ROM without pain, no spinal tenderness, 4+ strength UE/LE  SKIN: No rashes or ulcers noted; no subcutaneous nodules or induration palpable  NEURO: CN II-XII intact; normal reflexes in upper and lower extremities, sensation intact in upper and lower extremities b/l to light touch   PSYCH: Appropriate insight/judgment; A+O x 3, mood and affect appropriate, recent/remote memory intact

## 2023-07-13 NOTE — DISCHARGE NOTE PROVIDER - ATTENDING DISCHARGE PHYSICAL EXAMINATION:
Constitutional: WDWN resting comfortably in bed; NAD  Respiratory: CTA B/L; no W/R/R, no retractions  Cardiac: +S1/S2; RRR; no M/R/G  Extremities: WWP, no clubbing or cyanosis; no peripheral edema  Musculoskeletal: NROM x4; no joint swelling, tenderness or erythema  Neurologic: AAOx3; CNII-XII grossly intact; no focal deficits

## 2023-07-14 VITALS
DIASTOLIC BLOOD PRESSURE: 70 MMHG | TEMPERATURE: 98 F | RESPIRATION RATE: 18 BRPM | OXYGEN SATURATION: 100 % | HEART RATE: 65 BPM | SYSTOLIC BLOOD PRESSURE: 118 MMHG

## 2023-07-14 LAB
ALBUMIN SERPL ELPH-MCNC: 3.7 G/DL — SIGNIFICANT CHANGE UP (ref 3.3–5)
ALP SERPL-CCNC: 58 U/L — SIGNIFICANT CHANGE UP (ref 40–120)
ALT FLD-CCNC: 18 U/L — SIGNIFICANT CHANGE UP (ref 4–41)
ANION GAP SERPL CALC-SCNC: 9 MMOL/L — SIGNIFICANT CHANGE UP (ref 7–14)
AST SERPL-CCNC: 25 U/L — SIGNIFICANT CHANGE UP (ref 4–40)
BILIRUB SERPL-MCNC: 1.4 MG/DL — HIGH (ref 0.2–1.2)
BUN SERPL-MCNC: 12 MG/DL — SIGNIFICANT CHANGE UP (ref 7–23)
CALCIUM SERPL-MCNC: 8.7 MG/DL — SIGNIFICANT CHANGE UP (ref 8.4–10.5)
CHLORIDE SERPL-SCNC: 107 MMOL/L — SIGNIFICANT CHANGE UP (ref 98–107)
CK SERPL-CCNC: 804 U/L — HIGH (ref 30–200)
CO2 SERPL-SCNC: 22 MMOL/L — SIGNIFICANT CHANGE UP (ref 22–31)
CREAT SERPL-MCNC: 0.76 MG/DL — SIGNIFICANT CHANGE UP (ref 0.5–1.3)
EGFR: 123 ML/MIN/1.73M2 — SIGNIFICANT CHANGE UP
GLUCOSE SERPL-MCNC: 97 MG/DL — SIGNIFICANT CHANGE UP (ref 70–99)
POTASSIUM SERPL-MCNC: 4 MMOL/L — SIGNIFICANT CHANGE UP (ref 3.5–5.3)
POTASSIUM SERPL-SCNC: 4 MMOL/L — SIGNIFICANT CHANGE UP (ref 3.5–5.3)
PROT SERPL-MCNC: 5.8 G/DL — LOW (ref 6–8.3)
SODIUM SERPL-SCNC: 138 MMOL/L — SIGNIFICANT CHANGE UP (ref 135–145)

## 2023-07-14 PROCEDURE — 99239 HOSP IP/OBS DSCHRG MGMT >30: CPT

## 2023-07-14 RX ADMIN — SODIUM CHLORIDE 150 MILLILITER(S): 9 INJECTION INTRAMUSCULAR; INTRAVENOUS; SUBCUTANEOUS at 01:00

## 2023-07-14 NOTE — PROGRESS NOTE ADULT - PROBLEM SELECTOR PLAN 1
Patient with muscle pain and cramping that began in lower extremity and spread to diffuse pattern in setting of physically intense work day. UA w/ large blood, 14 rbc, elevated CK. Likely rhabdomyolysis vs less likely heat exhaustion given afebrile on admission.    - s/p 2L bolus LR  - hydration with NS@150cc/hr  - trend CK (582 -> 968 -> 1210 -> 804)

## 2023-07-14 NOTE — DISCHARGE NOTE NURSING/CASE MANAGEMENT/SOCIAL WORK - PATIENT PORTAL LINK FT
You can access the FollowMyHealth Patient Portal offered by [facility] by registering at the following website: [email or web address]. By joining [facility]’s FollowMyHealth portal, you will also be able to view your health information using other applications (apps) compatible with our system.

## 2023-07-14 NOTE — PROGRESS NOTE ADULT - PROBLEM SELECTOR PLAN 4
DVT: Ambulate as tolerated  Diet: regular diet  Communication: Discussed plan with wife at beside 7/13 PM

## 2023-07-14 NOTE — DISCHARGE NOTE NURSING/CASE MANAGEMENT/SOCIAL WORK - NSDCPEFALRISK_GEN_ALL_CORE
For information on Fall & Injury Prevention, visit: [email or web address]  [email or web address] OR  https://www.cdc.gov/steadi/patient.html

## 2023-07-14 NOTE — PROGRESS NOTE ADULT - SUBJECTIVE AND OBJECTIVE BOX
CC: muscle cramp, pain  INTERVAL HPI/OVERNIGHT EVENTS: Pt seen and examined at bedside this AM. Pt reports feeling well overnight. Pt denies shortness of breath, muscle cramps, pain, extremity swelling. Pt reports urinating well and feels hydrated. Pt updated about plan for discharge and questions answered.    REVIEW OF SYSTEMS:  CONSTITUTIONAL: No fever, weight loss, or fatigue  EYES: No eye pain, visual disturbances, or discharge  ENT:  No difficulty hearing, tinnitus, vertigo; No sinus or throat pain  NECK: No pain or stiffness  RESPIRATORY: No cough, wheezing, chills or hemoptysis; No shortness of breath  CARDIOVASCULAR: No chest pain, palpitations, dizziness, or leg swelling  GASTROINTESTINAL: No abdominal or epigastric pain. No nausea, vomiting or hematemesis; No diarrhea or constipation  GENITOURINARY: No dysuria, frequency, hematuria, or incontinence  NEUROLOGICAL: No headaches, memory loss, loss of strength, numbness, or tremors  SKIN: No itching, burning, rashes, or lesions   MUSCULOSKELETAL: No joint pain or swelling; No muscle, back, or extremity pain    VITAL SIGNS:  T(C): 36.4 (07-14-23 @ 05:40), Max: 36.7 (07-13-23 @ 22:36)  HR: 65 (07-14-23 @ 05:40) (52 - 65)  BP: 118/70 (07-14-23 @ 05:40) (104/60 - 118/70)  RR: 18 (07-14-23 @ 05:40) (18 - 18)  SpO2: 100% (07-14-23 @ 05:40) (99% - 100%)  Wt(kg): --    PHYSICAL EXAM:  GENERAL: Pt lying in bed comfortably in NAD  HEAD:  Atraumatic  EYES: EOMI, PERRL, conjunctiva and sclera clear  ENT: Moist mucous membranes  NECK: No JVD  CHEST/LUNG: Clear to auscultation bilaterally; No rales, rhonchi, wheezing, or rubs. Unlabored respirations  HEART: S1, S2, Regular rate and rhythm   ABDOMEN: Bowel sounds present; Soft, Nontender, Nondistended. No guarding or rigidity   EXTREMITIES:  2+ Peripheral Pulses, brisk capillary refill. No clubbing, cyanosis, or edema  NERVOUS SYSTEM:  Alert & Oriented X3, speech clear, FROM x 4 extremities. No deficits   SKIN: No rashes or lesions    LABS:                        13.7   5.62  )-----------( 191      ( 13 Jul 2023 14:50 )             41.5     07-14    138  |  107  |  12  ----------------------------<  97  4.0   |  22  |  0.76    Ca    8.7      14 Jul 2023 05:47  Phos  3.3     07-13  Mg     2.00     07-13    TPro  5.8<L>  /  Alb  3.7  /  TBili  1.4<H>  /  DBili  x   /  AST  25  /  ALT  18  /  AlkPhos  58  07-14      CAPILLARY BLOOD GLUCOSE          Urinalysis Basic - ( 14 Jul 2023 05:47 )    Color: x / Appearance: x / SG: x / pH: x  Gluc: 97 mg/dL / Ketone: x  / Bili: x / Urobili: x   Blood: x / Protein: x / Nitrite: x   Leuk Esterase: x / RBC: x / WBC x   Sq Epi: x / Non Sq Epi: x / Bacteria: x

## 2023-07-14 NOTE — PROGRESS NOTE ADULT - PROBLEM SELECTOR PLAN 3
Pt with reported "hernia like pain", diffuse pain across abdomen. Suspect metabolic i/s/o rhabdo vs less likely infectious given no N/V or diarrhea. CT A/P with no acute findings decreases suspicion for further structural causes. resolved.    - 7/12 CT A/P negative.
No

## 2023-07-14 NOTE — PROGRESS NOTE ADULT - ASSESSMENT
CHRISTIN MONROY is a 32yo M with no PMH who presented with acute onset of muscle pain and cramping after a day of working outside in the sun found to have UA, CK, and clinical history consistent with rhabdomyolysis. Pt admitted for management of rhabdomyolysis and currently improving clinically, with CK downtrending
